# Patient Record
Sex: FEMALE | Race: ASIAN
[De-identification: names, ages, dates, MRNs, and addresses within clinical notes are randomized per-mention and may not be internally consistent; named-entity substitution may affect disease eponyms.]

---

## 2018-01-12 ENCOUNTER — HOSPITAL ENCOUNTER (OUTPATIENT)
Dept: HOSPITAL 62 - LC | Age: 48
Discharge: HOME | End: 2018-01-12
Attending: OBSTETRICS & GYNECOLOGY
Payer: OTHER GOVERNMENT

## 2018-01-12 DIAGNOSIS — O47.03: Primary | ICD-10-CM

## 2018-01-12 DIAGNOSIS — Z3A.35: ICD-10-CM

## 2018-01-12 DIAGNOSIS — O09.523: ICD-10-CM

## 2018-01-12 LAB
APPEARANCE UR: (no result)
APTT PPP: YELLOW S
BARBITURATES UR QL SCN: NEGATIVE
BILIRUB UR QL STRIP: NEGATIVE
GLUCOSE UR STRIP-MCNC: NEGATIVE MG/DL
KETONES UR STRIP-MCNC: NEGATIVE MG/DL
METHADONE UR QL SCN: NEGATIVE
NITRITE UR QL STRIP: NEGATIVE
PCP UR QL SCN: NEGATIVE
PH UR STRIP: 7 [PH] (ref 5–9)
PROT UR STRIP-MCNC: NEGATIVE MG/DL
SP GR UR STRIP: 1.02
URINE AMPHETAMINES SCREEN: NEGATIVE
URINE BENZODIAZEPINES SCREEN: NEGATIVE
URINE COCAINE SCREEN: NEGATIVE
URINE MARIJUANA (THC) SCREEN: NEGATIVE
UROBILINOGEN UR-MCNC: NEGATIVE MG/DL (ref ?–2)

## 2018-01-12 PROCEDURE — 4A1HXCZ MONITORING OF PRODUCTS OF CONCEPTION, CARDIAC RATE, EXTERNAL APPROACH: ICD-10-PCS | Performed by: OBSTETRICS & GYNECOLOGY

## 2018-01-12 PROCEDURE — 81001 URINALYSIS AUTO W/SCOPE: CPT

## 2018-01-12 PROCEDURE — 80307 DRUG TEST PRSMV CHEM ANLYZR: CPT

## 2018-01-12 NOTE — NON STRESS TEST REPORT
=================================================================

Non Stress Test

=================================================================

Datetime Report Generated by CPN: 01/12/2018 23:25

   

   

=================================================================

DEMOGRAPHIC

=================================================================

   

EGA NST:  35.4

   

=================================================================

INDICATION

=================================================================

   

Indication for Study:  Ordered by Provider; Other

Indication for Study (NST) Other:  LC

   

=================================================================

MONITORING

=================================================================

   

Monitor Explained:  Monitor Explained; Test Explained; Patient

   Verbalized Understanding

Time on Monitor:  01/12/2018 22:30

Time off Monitor:  01/12/2018 23:18

NST Duration:  48

   

=================================================================

NST INTERVENTIONS

=================================================================

   

NST Interventions:  PO Hydration; Reposition Patient

Physician Notified NST:  Dr Schultz

BABY A:  [id number]

   

=================================================================

BABY A

=================================================================

   

Fetal Movement :  Present

Contraction Frequency :  Irregular with irritability

Accelerations :  15X15

Decelerations :  None

Variability :  Moderate 6-25bpm

NST Review:  Meets Criteria for Reactive NST

NST Review and Verified By :  NIXON Kearns RN Results:  Reactive

   

=================================================================

NST REPORT

=================================================================

   

Report Trigger:  Send Report

## 2018-01-19 ENCOUNTER — HOSPITAL ENCOUNTER (OUTPATIENT)
Dept: HOSPITAL 62 - LC | Age: 48
Discharge: HOME | End: 2018-01-19
Attending: OBSTETRICS & GYNECOLOGY
Payer: OTHER GOVERNMENT

## 2018-01-19 DIAGNOSIS — O09.523: ICD-10-CM

## 2018-01-19 DIAGNOSIS — Z3A.36: ICD-10-CM

## 2018-01-19 DIAGNOSIS — O47.03: Primary | ICD-10-CM

## 2018-01-19 PROCEDURE — 4A1HXCZ MONITORING OF PRODUCTS OF CONCEPTION, CARDIAC RATE, EXTERNAL APPROACH: ICD-10-PCS | Performed by: OBSTETRICS & GYNECOLOGY

## 2018-01-19 PROCEDURE — 59025 FETAL NON-STRESS TEST: CPT

## 2018-01-19 NOTE — NON STRESS TEST REPORT
=================================================================

Non Stress Test

=================================================================

Datetime Report Generated by CPN: 01/19/2018 11:01

   

   

=================================================================

DEMOGRAPHIC

=================================================================

   

EGA NST:  36.4

   

=================================================================

INDICATION

=================================================================

   

Indication for Study:  Ordered by Provider

   

=================================================================

MONITORING

=================================================================

   

Monitor Explained:  Monitor Explained; Test Explained; Patient

   Verbalized Understanding

Time on Monitor:  01/19/2018 10:30

Time off Monitor:  01/19/2018 10:50

NST Duration:  20

   

=================================================================

NST INTERVENTIONS

=================================================================

   

NST Interventions:  PO Hydration

Physician Notified NST:  Dr. Anderson

BABY A:  [id number]

   

=================================================================

BABY A

=================================================================

   

Fetal Movement :  Present

Contraction Frequency :  irregular

FHR Baseline :  135

Accelerations :  15X15

Decelerations :  None

Variability :  Moderate 6-25bpm

NST Review:  Meets Criteria for Reactive NST

NST Review and Verified By :  RYNE MEDLEY

NST Results:  Reactive

   

=================================================================

NST REPORT

=================================================================

   

Report Trigger:  Send Report

## 2018-01-23 ENCOUNTER — HOSPITAL ENCOUNTER (OUTPATIENT)
Dept: HOSPITAL 62 - LC | Age: 48
Discharge: HOME | End: 2018-01-23
Attending: OBSTETRICS & GYNECOLOGY
Payer: OTHER GOVERNMENT

## 2018-01-23 DIAGNOSIS — Z3A.37: ICD-10-CM

## 2018-01-23 DIAGNOSIS — O09.523: Primary | ICD-10-CM

## 2018-01-23 LAB
A1 MICROGLOB PLACENTAL VAG QL: NEGATIVE
APPEARANCE UR: (no result)
APTT PPP: YELLOW S
BARBITURATES UR QL SCN: NEGATIVE
BILIRUB UR QL STRIP: NEGATIVE
GLUCOSE UR STRIP-MCNC: NEGATIVE MG/DL
KETONES UR STRIP-MCNC: NEGATIVE MG/DL
METHADONE UR QL SCN: NEGATIVE
NITRITE UR QL STRIP: NEGATIVE
PCP UR QL SCN: NEGATIVE
PH UR STRIP: 7 [PH] (ref 5–9)
PROT UR STRIP-MCNC: NEGATIVE MG/DL
SP GR UR STRIP: 1.02
URINE AMPHETAMINES SCREEN: NEGATIVE
URINE BENZODIAZEPINES SCREEN: NEGATIVE
URINE COCAINE SCREEN: NEGATIVE
URINE MARIJUANA (THC) SCREEN: NEGATIVE
UROBILINOGEN UR-MCNC: NEGATIVE MG/DL (ref ?–2)

## 2018-01-23 PROCEDURE — 81001 URINALYSIS AUTO W/SCOPE: CPT

## 2018-01-23 PROCEDURE — 84112 EVAL AMNIOTIC FLUID PROTEIN: CPT

## 2018-01-23 PROCEDURE — 4A1HXCZ MONITORING OF PRODUCTS OF CONCEPTION, CARDIAC RATE, EXTERNAL APPROACH: ICD-10-PCS | Performed by: OBSTETRICS & GYNECOLOGY

## 2018-01-23 PROCEDURE — 59025 FETAL NON-STRESS TEST: CPT

## 2018-01-23 PROCEDURE — 80307 DRUG TEST PRSMV CHEM ANLYZR: CPT

## 2018-01-23 NOTE — NON STRESS TEST REPORT
=================================================================

Non Stress Test

=================================================================

Datetime Report Generated by CPN: 01/23/2018 15:51

   

   

=================================================================

DEMOGRAPHIC

=================================================================

   

EGA NST:  37.1

   

=================================================================

INDICATION

=================================================================

   

Indication for Study:  Ordered by Provider; Other

Indication for Study (NST) Other:  AMA; REPEAT NST

   

=================================================================

MONITORING 

=================================================================

   

Monitor Explained:  Monitor Explained; Test Explained; Patient

   Verbalized Understanding

Time on Monitor:  01/23/2018 14:51

Time off Monitor:  01/23/2018 15:41

NST Duration:  50

   

=================================================================

NST INTERVENTIONS

=================================================================

   

NST Interventions:  PO Hydration; Reposition Patient

Physician Notified NST:  RYNE ADAIR CNM

BABY A:  [id number]

Fetal Movement :  Present

Contraction Frequency :  X2

FHR Baseline :  145

Accelerations :  15X15

Decelerations :  None

Variability :  Moderate 6-25bpm

NST Review:  Meets Criteria for Reactive NST

NST Review and Verified By :  ANA MAIER RN

NST Results:  Reactive

   

=================================================================

NST REPORT

=================================================================

   

Report Trigger:  Send Report

## 2018-01-30 ENCOUNTER — HOSPITAL ENCOUNTER (INPATIENT)
Dept: HOSPITAL 62 - LR | Age: 48
LOS: 3 days | Discharge: HOME | End: 2018-02-02
Attending: OBSTETRICS & GYNECOLOGY | Admitting: OBSTETRICS & GYNECOLOGY
Payer: OTHER GOVERNMENT

## 2018-01-30 DIAGNOSIS — O41.03X0: Primary | ICD-10-CM

## 2018-01-30 DIAGNOSIS — Z87.891: ICD-10-CM

## 2018-01-30 DIAGNOSIS — Z3A.38: ICD-10-CM

## 2018-01-30 LAB
ADD MANUAL DIFF: NO
APPEARANCE UR: (no result)
APTT PPP: YELLOW S
BARBITURATES UR QL SCN: NEGATIVE
BASOPHILS # BLD AUTO: 0.1 10^3/UL (ref 0–0.2)
BASOPHILS NFR BLD AUTO: 0.6 % (ref 0–2)
BILIRUB UR QL STRIP: NEGATIVE
EOSINOPHIL # BLD AUTO: 0 10^3/UL (ref 0–0.6)
EOSINOPHIL NFR BLD AUTO: 0.3 % (ref 0–6)
ERYTHROCYTE [DISTWIDTH] IN BLOOD BY AUTOMATED COUNT: 13.1 % (ref 11.5–14)
GLUCOSE UR STRIP-MCNC: NEGATIVE MG/DL
HCT VFR BLD CALC: 37.2 % (ref 36–47)
HGB BLD-MCNC: 12.6 G/DL (ref 12–15.5)
KETONES UR STRIP-MCNC: NEGATIVE MG/DL
LYMPHOCYTES # BLD AUTO: 1.5 10^3/UL (ref 0.5–4.7)
LYMPHOCYTES NFR BLD AUTO: 18.2 % (ref 13–45)
MCH RBC QN AUTO: 30.9 PG (ref 27–33.4)
MCHC RBC AUTO-ENTMCNC: 33.8 G/DL (ref 32–36)
MCV RBC AUTO: 91 FL (ref 80–97)
METHADONE UR QL SCN: NEGATIVE
MONOCYTES # BLD AUTO: 0.5 10^3/UL (ref 0.1–1.4)
MONOCYTES NFR BLD AUTO: 5.4 % (ref 3–13)
NEUTROPHILS # BLD AUTO: 6.3 10^3/UL (ref 1.7–8.2)
NEUTS SEG NFR BLD AUTO: 75.5 % (ref 42–78)
NITRITE UR QL STRIP: NEGATIVE
PCP UR QL SCN: NEGATIVE
PH UR STRIP: 6 [PH] (ref 5–9)
PLATELET # BLD: 224 10^3/UL (ref 150–450)
PROT UR STRIP-MCNC: NEGATIVE MG/DL
RBC # BLD AUTO: 4.07 10^6/UL (ref 3.72–5.28)
SP GR UR STRIP: 1.03
TOTAL CELLS COUNTED % (AUTO): 100 %
URINE AMPHETAMINES SCREEN: NEGATIVE
URINE BENZODIAZEPINES SCREEN: NEGATIVE
URINE COCAINE SCREEN: NEGATIVE
URINE MARIJUANA (THC) SCREEN: NEGATIVE
UROBILINOGEN UR-MCNC: NEGATIVE MG/DL (ref ?–2)
WBC # BLD AUTO: 8.3 10^3/UL (ref 4–10.5)

## 2018-01-30 PROCEDURE — 86900 BLOOD TYPING SEROLOGIC ABO: CPT

## 2018-01-30 PROCEDURE — 85025 COMPLETE CBC W/AUTO DIFF WBC: CPT

## 2018-01-30 PROCEDURE — 86592 SYPHILIS TEST NON-TREP QUAL: CPT

## 2018-01-30 PROCEDURE — 4A1HXCZ MONITORING OF PRODUCTS OF CONCEPTION, CARDIAC RATE, EXTERNAL APPROACH: ICD-10-PCS | Performed by: MIDWIFE

## 2018-01-30 PROCEDURE — 86850 RBC ANTIBODY SCREEN: CPT

## 2018-01-30 PROCEDURE — 36415 COLL VENOUS BLD VENIPUNCTURE: CPT

## 2018-01-30 PROCEDURE — 85027 COMPLETE CBC AUTOMATED: CPT

## 2018-01-30 PROCEDURE — 86901 BLOOD TYPING SEROLOGIC RH(D): CPT

## 2018-01-30 PROCEDURE — 81005 URINALYSIS: CPT

## 2018-01-30 PROCEDURE — 80307 DRUG TEST PRSMV CHEM ANLYZR: CPT

## 2018-01-30 RX ADMIN — SODIUM CHLORIDE, SODIUM LACTATE, POTASSIUM CHLORIDE, AND CALCIUM CHLORIDE PRN ML: .6; .31; .03; .02 INJECTION, SOLUTION INTRAVENOUS at 23:24

## 2018-01-31 PROCEDURE — 0HQ9XZZ REPAIR PERINEUM SKIN, EXTERNAL APPROACH: ICD-10-PCS | Performed by: MIDWIFE

## 2018-01-31 PROCEDURE — 10907ZC DRAINAGE OF AMNIOTIC FLUID, THERAPEUTIC FROM PRODUCTS OF CONCEPTION, VIA NATURAL OR ARTIFICIAL OPENING: ICD-10-PCS | Performed by: MIDWIFE

## 2018-01-31 PROCEDURE — 3E0P7VZ INTRODUCTION OF HORMONE INTO FEMALE REPRODUCTIVE, VIA NATURAL OR ARTIFICIAL OPENING: ICD-10-PCS | Performed by: MIDWIFE

## 2018-01-31 PROCEDURE — 3E033VJ INTRODUCTION OF OTHER HORMONE INTO PERIPHERAL VEIN, PERCUTANEOUS APPROACH: ICD-10-PCS | Performed by: MIDWIFE

## 2018-01-31 RX ADMIN — Medication PRN ML: at 16:40

## 2018-01-31 RX ADMIN — Medication PRN ML: at 11:08

## 2018-01-31 RX ADMIN — SODIUM CHLORIDE, SODIUM LACTATE, POTASSIUM CHLORIDE, AND CALCIUM CHLORIDE PRN ML: .6; .31; .03; .02 INJECTION, SOLUTION INTRAVENOUS at 16:40

## 2018-01-31 RX ADMIN — DOCUSATE SODIUM SCH MG: 100 CAPSULE, LIQUID FILLED ORAL at 17:22

## 2018-01-31 RX ADMIN — FAMOTIDINE SCH MG: 20 TABLET, FILM COATED ORAL at 23:36

## 2018-01-31 RX ADMIN — IBUPROFEN SCH MG: 800 TABLET, FILM COATED ORAL at 23:25

## 2018-01-31 RX ADMIN — FERROUS SULFATE TAB 325 MG (65 MG ELEMENTAL FE) SCH MG: 325 (65 FE) TAB at 17:24

## 2018-01-31 RX ADMIN — SODIUM CHLORIDE, SODIUM LACTATE, POTASSIUM CHLORIDE, AND CALCIUM CHLORIDE PRN ML: .6; .31; .03; .02 INJECTION, SOLUTION INTRAVENOUS at 10:00

## 2018-01-31 NOTE — L&D PROGRESS NOTES
=================================================================

PROGRESS NOTES

=================================================================

Datetime Report Generated by CPN: 01/31/2018 11:03

   

   

=================================================================

PROGRESS NOTE

=================================================================

   

Impression:  Normal Progression of Labor

Procedures:  Artificial ROM

Plan Other:  start pit

Vital Signs :  Reviewed

Comment:  Feeling much better with epidural

   SVE

   AROM, bloody fluid

   Start pitocin

   Small amount bloody fluid

   

=================================================================

VAGINAL EXAM

=================================================================

   

Dilatation:  6

Dilatation:  1

Effacement:  100

Effacement:  0

Station:  0

Station:  -1

Contractions:  2-5

   

=================================================================

MEMBRANES

=================================================================

   

Pooling:  Negative

Membranes:  Ruptured

Membranes:  Intact

Amniotic Fluid Color:  Bloody

   

=================================================================

FETUS A

=================================================================

   

FHR - Baseline:  145

Monitoring:  External US

Accelerations:  15X15

Decelerations:  None

:  38.0

Estimated Fetal Weight (gm):  2800

Fetal Presentation:  Vertex

   

=================================================================

SIGNATURE

=================================================================

   

SIGNATURE:  [id number];[id number]

SIGNATURE:  [id number]

SIGNATURE:  [id number]

SIGNATURE:  [id number]

Assignment:  Leticia Anderson MD

Signature:  Electronically signed by Mere Velazquez CNM on 1/31/2018 at

   11:02  with User ID: [email or web address]

:  Electronically signed by Mere Velazquez CNM on 1/31/2018 at 11:02 

   with User ID: [email or web address]

## 2018-01-31 NOTE — ADMISSION PHYSICAL
=================================================================



=================================================================

Datetime Report Generated by CPN: 2018 17:48

   

   

=================================================================

CURRENT ADMISSION

=================================================================

   

Chief Complaint:  Other

Indication for Induction:  Oligohydramnios

Indication for Induction:  Term, Intrauterine Pregnancy; Induction of

   Labor

Admit Plan:  Admit to Unit; Initiate Labor Induction Protocol

   

=================================================================

ALLERGIES

=================================================================

   

Medication Allergies:  No

Medication Allergies:  No Known Allergies (2018)

Latex:  No Latex Allergies

Food Allergies:  None

Environmental Allergies:  None

   

=================================================================

OBSTETRICAL HISTORY

=================================================================

   

EDC:  2018 00:00

:  3

Para:  1

Term:  0

:  1

SAB:  0

IAB:  0

Ectopic:  0

Livin

Cesareans:  0

VBACs:  0

Multiple Births:  0

Gestational Diabetes:  No

Rh Sensitization:  No

Incompetent Cervix:  No

FRANNIE:  No

Infertility:  No

ART Treatment:  No

Uterine Anomaly:  No

IUGR:  No

Hx Previous C/S:  No

Macrosomia:  No

Hx Loss/Stillborn:  No

PIH:  No

Hx  Death:  No

Placenta Previa/Abruption:  No

Depression/PP Depression:  No

PTL/PROM:  Yes

Post Partum Hemorrhage:  No

Current Pregnancy Procedures:  Ultrasound; NST

Obstetrical History Comments:  G1:  36 week  male 5 lb 14 oz 

   G2:  SAB 

   G3: Current, low lying placenta resolved, vag delivery appropriate

   per MFM 

   

=================================================================

***SEE PRENATAL RECORDS***

=================================================================

   

Alcohol:  No

Marijuana :  No

Cocaine:  No

Other Illicit Drugs:  No

Cigarettes:  Former Smoker. [id number]

   

=================================================================

MEDICAL HISTORY

=================================================================

   

Diabetes:  No

Blood Transfusion:  No

Pulmonary Disease (Asthma, TB):  No

Breast Disease:  No

Hypertension:  No

Gyn Surgery:  Yes

Heart Disease:  No

Hosp/Surgery:  Yes

Autoimmune Disorder:  No

Anesthetic Complications:  No

Kidney Disease:  Yes

Abnormal Pap Smear:  Yes

Neuro/Epilepsy:  No

Psychiatric Disorders:  No

Other Medical Diseases:  No

Hepatitis/Liver Disease:  No

Significant Family History:  No

Varicosities/Phlebitis:  No

Trauma/Violence :  No

Thyroid Dysfunction:  No

Medical History Comments:  abn pap ; laparoscopy for endometriosis

   x3 [id number]; gallbladder out ; UTI this pregnancy x2; 

   

=================================================================

INFECTIOUS HISTORY

=================================================================

   

Gonorrhea:  No

Genital Herpes:  No

Chlamydia:  Yes

Tuberculosis:  No

Syphilis:  No

Hepatitis:  No

HIV/AIDS Exposure:  No

Rash or Viral Illness:  No

HPV:  No

Infectious History Comments:  chlam- date unknown 

   

=================================================================

PHYSICAL EXAM

=================================================================

   

General:  Normal

HEENT:  Normal

Neurologic:  Normal

Thyroid:  Normal

Heart:  Normal

Lungs:  Normal

Breast:  Deferred

Back:  Normal

Abdomen:  Normal

Genitourinary Exam:  Normal

Extremities:  Normal

DTRs:  Normal

Pelvic Type:  Adequate

Vital Signs:  Reviewed

   

=================================================================

VAGINAL EXAM

=================================================================

   

Dilatation:  6

Dilatation:  1

Effacement:  100

Effacement:  0

Station:  0

Station:  -1

Contraction Comments:  2-5

   

=================================================================

MEMBRANES

=================================================================

   

Pooling:  Negative

Membranes:  Ruptured

Membranes:  Intact

Amniotic Fluid Color:  Bloody

   

=================================================================

FETUS A

=================================================================

   

EGA:  38.1

Monitoring:  External US

FHR- Baseline:  140

Variability:  Absent - Undetectable

Decelerations:  None

FHR Category:  Category I

Estimated Fetal Weight (gm):  2800

Fetal Presentation:  Vertex

Admit Comment:  cervidil tonight for induction

   

=================================================================

PLANS FOR LABOR AND DELIVERY

=================================================================

   

Labor and Delivery:  None

Pain Management:  Epidural

Feeding Preference:  Breast

Benefit of Breast Feed Discussed:  Yes

Circumcision:  No

   

=================================================================

INFORMED CONSENT

=================================================================

   

Signature:  Electronically signed by Agustín Schultz MD ([email or web address]) on

   2018 at 17:13  with User ID: [email or web address]

## 2018-01-31 NOTE — DELIVERY SUMMARY
=================================================================

Del Sum A-C

=================================================================

Datetime Report Generated by CPN: 2018 16:53

   

   

=================================================================

DELIVERY PERSONNEL

=================================================================

   

DELIVERY PERSONNEL:  [id number]

Delivery Doctor::  Mere Velazquez CNM

Labor and Delivery Nurse::  JASMYNE Granger

Labor and Delivery Nurse::  Yazmin Steen RN

Nursery Nurse::  Lamar Jim RN

Scrub Tech/CNA:  Carli Cardoza, ST

   

=================================================================

MATERNAL INFORMATION

=================================================================

   

Delivery Anesthesia:  Epidural

Medications After Delivery:  Pitocin Bolus-Please Comment; Other-Please

   Comment

Meds After Delivery Comment:  PPitocin 20 units in 1000 mlnss, Cytotec

   1000 mcg pr by provider

Estimated  Blood Loss (ml):  450

Maternal Complications:  Other

Other Maternal Complications:  AMA

Provider Comments:   of viable female infant, head, shoulders, and

   body delivered without difficulty. Infant with spontaneous cry and

   respirations to maternal abodmen, cord clamped X2 and infant cut

   free after 2 min delay. Spontaneous delivery of placenta via tompkins

   mechanism, appears intact 3 VC. Vagina and perineum inspected

   laceration repaired as above. Cytotec 1000 mcg rectally for heavy

   bleeding, hemostasis with external fundal massage and IV pitocin.

   Mother and infant in stable condition, routine pp care. 

   

=================================================================

LABOR SUMMARY

=================================================================

   

EDC:  2018 00:00

No. Babies in Womb:  1

 Attempted:  No

Labor Anesthesia:  Epidural

   

=================================================================

LABOR INFORMATION

=================================================================

   

Reason for Induction:  Oligohydramnios

Onset of Labor:  2018 09:00

Complete Dilatation:  2018 09:00

Cervical Ripening Agents:  Cervidil

Oxytocin:  Induction

Group B Beta Strep:  negative

Antibiotics # of Doses:  0

Steroids Given:  None

Reason Steroids Not Administered:  Not Applicable

   

=================================================================

MEMBRANES

=================================================================

   

Membranes Rupture Method:  Artificial

Rupture of Membranes:  2018 10:50

Length of Rupture (hr):  4.42

Amniotic Fluid Color:  Bloody

Amniotic Fluid Amount:  Scant

Amniotic Fluid Odor:  Normal

   

=================================================================

STAGES OF LABOR

=================================================================

   

Stage 1 hr:  0

Stage 1 min:  0

Stage 2 hr:  6

Stage 2 min:  15

Stage 3 hr:  0

Stage 3 min:  3

Total Time in Labor hr:  6

Total Time in Labor min:  18

   

=================================================================

VAGINAL DELIVERY

=================================================================

   

Episiotomy:  None

Laceration #1:  Perineal

Laceration Extension #1:  First Degree

Laceration #2:  None

Laceration #3:  None

Laceration Repair:  Yes

Laceration Repair Note:  Repaired with 2-0 chromic in usual fashion

   using epidural. 

Sponge Count Correct:  N/A

Sharps Count Correct:  N/A

   

=================================================================

CSECTION DELIVERY

=================================================================

   

Primary Indication:  N/A

Secondary Indication:  N/A

CSection Incidence:  N/A

Labor:  N/A

Elective:  N/A

CSection Incision:  N/A

   

=================================================================

BABY A INFORMATION

=================================================================

   

Infant Delivery Date/Time:  2018 15:15

Method of Delivery:  Vaginal

Born in Route :  No

:  N/A

Forceps:  N/A

Vacuum Extraction:  N/A

Shoulder Dystocia :  No

   

=================================================================

PRESENTATION/POSITION BABY A

=================================================================

   

Presentation:  Cephalic

Cephalic Presentation:  Vertex

Vertex Position:  Left Occipital Anterior

Breech Presentation:  N/A

   

=================================================================

PLACENTA INFORMATION BABY A

=================================================================

   

Placenta Delivery Time :  2018 15:18

Placenta Method of Delivery:  Spontaneous

Placenta Status:  Delivered

   

=================================================================

APGAR SCORES BABY A

=================================================================

   

Heart Rate 1 min:  >100 bpm

Resp Effort 1 min:  Good Cry

Reflex Irritability 1 min:  Cough or Sneeze or Pulls Away

Muscle Tone 1 min:  Active Motion

Color 1 min:  Body Pink, Extremities Blue

Resuscitation Effort 1 min:  Tactile Stimulation

APGAR SCORE 1 MIN:  9

Heart Rate 5 min:  >100 bpm

Resp Effort 5 min:  Good Cry

Reflex Irritability 5 min:  Cough or Sneeze or Pulls Away

Muscle Tone 5 min:  Active Motion

Color 5 min:  Body Pink, Extremities Blue

APGAR SCORE 5 MIN:  9

   

=================================================================

INFANT INFORMATION BABY A

=================================================================

   

Gestational Age at Delivery:  38.2

Gestational Status:  Early Term- 37- 38.6 Weeks

Infant Outcome :  Liveborn

Infant Condition :  Stable

Infant Sex:  Female

   

=================================================================

IDENTIFICATION BABY A

=================================================================

   

Infant Verification Date/Time:  2018 15:54

ID Band Number:  [id number]

Mother's Name Verified:  Yes

Infant Medical Record Number:  [id number]

RN Verifying Infant:  Annel Camp [facility]

Additional Verifying Personnel:  JAZMYNE Cardoza CST

   

=================================================================

WEIGHT/LENGTH BABY A

=================================================================

   

Infant Birthweight (gm):  2820

Infant Weight (lb):  6

Infant Weight (oz):  3

Infant Length (in):  19.00

Infant Length (cm):  48.26

   

=================================================================

CORD INFORMATION BABY A

=================================================================

   

No. Cord Vessels:  3

Nuchal Cord :  N/A

Cord Blood Taken:  Yes-For Storage (Mom's Blood type +)

Infant Suction:  Mouth

   

=================================================================

ASSESSMENT BABY A

=================================================================

   

Infant Complications:  None

Physical Findings at Delivery:  Within Normal Limits

Infant Respirations:  Appears Normal

Skin to Skin:  Yes

Skin to Skin Time (min):  60

Neonatologist/ALS Called :  No

Infant Care By:  YASEMIN Jim RN

Transferred To:  Remains with Mother

   

=================================================================

BABY B INFORMATION

=================================================================

   

 :  N/A

   

=================================================================

SIGNATURES

=================================================================

   

Assignment:  Leticia Anderson MD

Signature:  Electronically signed by Mere Velazquez CNM on 2018 at

   15:56  with User ID: [email or web address]

:  Electronically signed by Mere Velazquez CNM on 2018 at 15:56 

   with User ID: [email or web address]

## 2018-02-01 LAB
ERYTHROCYTE [DISTWIDTH] IN BLOOD BY AUTOMATED COUNT: 13.2 % (ref 11.5–14)
HCT VFR BLD CALC: 32.2 % (ref 36–47)
HGB BLD-MCNC: 11 G/DL (ref 12–15.5)
MCH RBC QN AUTO: 31.6 PG (ref 27–33.4)
MCHC RBC AUTO-ENTMCNC: 34.2 G/DL (ref 32–36)
MCV RBC AUTO: 92 FL (ref 80–97)
PLATELET # BLD: 171 10^3/UL (ref 150–450)
RBC # BLD AUTO: 3.48 10^6/UL (ref 3.72–5.28)
WBC # BLD AUTO: 13.6 10^3/UL (ref 4–10.5)

## 2018-02-01 RX ADMIN — FAMOTIDINE SCH MG: 20 TABLET, FILM COATED ORAL at 09:57

## 2018-02-01 RX ADMIN — FERROUS SULFATE TAB 325 MG (65 MG ELEMENTAL FE) SCH MG: 325 (65 FE) TAB at 17:37

## 2018-02-01 RX ADMIN — IBUPROFEN SCH MG: 800 TABLET, FILM COATED ORAL at 14:45

## 2018-02-01 RX ADMIN — IBUPROFEN SCH MG: 800 TABLET, FILM COATED ORAL at 22:55

## 2018-02-01 RX ADMIN — IBUPROFEN SCH MG: 800 TABLET, FILM COATED ORAL at 06:49

## 2018-02-01 RX ADMIN — FERROUS SULFATE TAB 325 MG (65 MG ELEMENTAL FE) SCH MG: 325 (65 FE) TAB at 09:56

## 2018-02-01 RX ADMIN — DOCUSATE SODIUM SCH MG: 100 CAPSULE, LIQUID FILLED ORAL at 09:56

## 2018-02-01 RX ADMIN — DOCUSATE SODIUM SCH MG: 100 CAPSULE, LIQUID FILLED ORAL at 17:38

## 2018-02-01 RX ADMIN — SENNOSIDES, DOCUSATE SODIUM SCH EACH: 50; 8.6 TABLET, FILM COATED ORAL at 09:55

## 2018-02-01 RX ADMIN — Medication SCH CAP: at 09:56

## 2018-02-01 RX ADMIN — FAMOTIDINE SCH MG: 20 TABLET, FILM COATED ORAL at 22:55

## 2018-02-01 NOTE — PDOC PROGRESS REPORT
Subjective-OB


Subjective: 


Post Delivery Day:








47 year old.  Denies any needs at this time 








Physical Exam (OB)


Vital Signs: 


 











Temp Pulse Resp BP Pulse Ox


 


 98.0 F   82   17   106/60   100 


 


 02/01/18 07:42  02/01/18 07:42  02/01/18 07:42  02/01/18 07:42  02/01/18 07:42








 Intake & Output











 01/31/18 02/01/18 02/02/18





 06:59 06:59 06:59


 


Weight  65 kg 














- PIH/Pre-Eclampsia


DTR's: 1 +


Clonus: Negative


Headache: Absent


Epigastric Pain: No


Visual Changes: No





- Lochia


Lochia Amount: Small 10-25 ml


Lochia Color: Rubra/Red





- Abdomen


Description: Soft, Flat


Hernia Present: No


Bowel Sounds: Normoactive


Flatus Presence: Present


Stool: No


Fundal Description: Firm, Midline


Fundal Height: u/u - u/2





- Respiratory


Breath sounds: Clear





Objective-Diagnostic


Laboratory: 


 





 02/01/18 07:04 





 











  02/01/18





  07:04


 


WBC  13.6 H


 


RBC  3.48 L


 


Hgb  11.0 L


 


Hct  32.2 L


 


MCV  92


 


MCH  31.6


 


MCHC  34.2


 


RDW  13.2


 


Plt Count  171

## 2018-02-02 VITALS — SYSTOLIC BLOOD PRESSURE: 106 MMHG | DIASTOLIC BLOOD PRESSURE: 64 MMHG

## 2018-02-02 RX ADMIN — SENNOSIDES, DOCUSATE SODIUM SCH EACH: 50; 8.6 TABLET, FILM COATED ORAL at 10:47

## 2018-02-02 RX ADMIN — Medication SCH CAP: at 10:47

## 2018-02-02 RX ADMIN — FAMOTIDINE SCH MG: 20 TABLET, FILM COATED ORAL at 10:46

## 2018-02-02 RX ADMIN — DOCUSATE SODIUM SCH MG: 100 CAPSULE, LIQUID FILLED ORAL at 10:47

## 2018-02-02 RX ADMIN — IBUPROFEN SCH MG: 800 TABLET, FILM COATED ORAL at 06:35

## 2018-02-02 RX ADMIN — FERROUS SULFATE TAB 325 MG (65 MG ELEMENTAL FE) SCH MG: 325 (65 FE) TAB at 10:47

## 2018-02-02 NOTE — PDOC PROGRESS REPORT
Subjective-OB


Subjective: 


Post Delivery Day:








47 year old.  Denies any needs at this time.  Ready to go home. 








Physical Exam (OB)


Vital Signs: 


 











Temp Pulse Resp BP Pulse Ox


 


 98.0 F   79   16   117/74   99 


 


 02/01/18 19:31  02/01/18 19:31  02/01/18 19:31  02/01/18 19:31  02/01/18 19:31








 Intake & Output











 02/01/18 02/02/18 02/03/18





 06:59 06:59 06:59


 


Weight 65 kg  














- PIH/Pre-Eclampsia


DTR's: 1 +


Clonus: Negative


Headache: Absent


Epigastric Pain: No


Visual Changes: No





- Lochia


Lochia Amount: Scant < 10 ml


Lochia Color: Rubra/Red





- Abdomen


Description: Soft, Flat


Hernia Present: No


Bowel Sounds: Normoactive


Flatus Presence: Present


Stool: Yes


Fundal Description: Firm, Midline


Fundal Height: u/u - u/2





Objective-Diagnostic


Laboratory: 


 





 02/01/18 07:04

## 2018-02-02 NOTE — PDOC DISCHARGE SUMMARY
Final Diagnosis


Discharge Date: 18





- Final Diagnosis


(1) AMA (advanced maternal age) multigravida 35+


Is this a current diagnosis for this admission?: Yes   





(2) Oligohydramnios


Is this a current diagnosis for this admission?: Yes   





(3) Pregnancy


Is this a current diagnosis for this admission?: Yes   





(4) Vaginal delivery


Is this a current diagnosis for this admission?: Yes   





Discharge Data





- Discharge Medication


Home Medications: 








Prenatal Vit/Iron Fum/Folic AC [Prenatal Tablet] 1 tab PO DAILY 18 








Gestational Age: 38.2 wks


Reason(s) for Admission: Induction of Labor, Obstetric Complications


Prenatal Procedures: Ultrasound


Intrapartum Procedure(s): Spontaneous Vaginal Delivery


Postpartum Complication(s): Laceration-Perineal


Laceration-Degree: 1st





- [place] Data


  ** Baby 1 Female


Apgar at 1 minute: 9


Apgar at 5 minutes: 9


Weight: 2.807 kg


Home with Mother: Yes


Complications: No





- Diagnosis Test


Laboratory: 


 











Temp Pulse Resp BP Pulse Ox


 


 98.0 F   79   16   117/74   99 


 


 18 19:31  18 19:31  18 19:31  18 19:31  18 19:31








 











  18





  17:08 17:45 07:04


 


RBC   4.07  3.48 L


 


Hgb   12.6  11.0 L


 


Hct   37.2  32.2 L


 


Urine Opiates Screen  NEGATIVE  














- Discharge information/Instructions


Discharge Activity: Activity As Tolerated, Balance Activity w/Rest, Pelvic Rest

, Slowly Increase Activity, No tub bath


Discharge Diet: Regular


Disposition: HOME, SELF-CARE


Follow up with: [facility]


in: 4, Weeks